# Patient Record
Sex: FEMALE | Race: ASIAN | Employment: UNEMPLOYED
[De-identification: names, ages, dates, MRNs, and addresses within clinical notes are randomized per-mention and may not be internally consistent; named-entity substitution may affect disease eponyms.]

---

## 2017-07-08 ENCOUNTER — HEALTH MAINTENANCE LETTER (OUTPATIENT)
Age: 47
End: 2017-07-08

## 2017-12-02 ENCOUNTER — TELEPHONE (OUTPATIENT)
Dept: FAMILY MEDICINE | Facility: CLINIC | Age: 47
End: 2017-12-02

## 2017-12-02 NOTE — TELEPHONE ENCOUNTER
12/2/2017    Call Regarding ReattributionPhysical    Attempt 1    Message on voicemail     Comments:       Outreach   SB

## 2018-02-16 NOTE — TELEPHONE ENCOUNTER
2/16/2018    Call Regarding Reattribution Physical       Attempt 2    Message on voicemail     Comments:       Outreach   Lesli Barnhart

## 2018-02-26 NOTE — TELEPHONE ENCOUNTER
2/26/2018    Call Regarding ReattributionPhysical    Attempt 3    Message on voicemail     Comments:       Outreach   SV

## 2020-03-17 ENCOUNTER — VIRTUAL VISIT (OUTPATIENT)
Dept: FAMILY MEDICINE | Facility: CLINIC | Age: 50
End: 2020-03-17
Payer: COMMERCIAL

## 2020-03-17 DIAGNOSIS — F51.04 PSYCHOPHYSIOLOGICAL INSOMNIA: ICD-10-CM

## 2020-03-17 DIAGNOSIS — F41.9 ANXIETY: Primary | ICD-10-CM

## 2020-03-17 PROCEDURE — 99442 ZZC PHYSICIAN TELEPHONE EVALUATION 11-20 MIN: CPT | Performed by: FAMILY MEDICINE

## 2020-03-17 RX ORDER — TRAZODONE HYDROCHLORIDE 100 MG/1
100 TABLET ORAL AT BEDTIME
Qty: 90 TABLET | Refills: 1 | Status: SHIPPED | OUTPATIENT
Start: 2020-03-17

## 2020-03-17 RX ORDER — SERTRALINE HYDROCHLORIDE 100 MG/1
TABLET, FILM COATED ORAL
Qty: 90 TABLET | Refills: 1 | Status: SHIPPED | OUTPATIENT
Start: 2020-03-17

## 2020-03-17 NOTE — PROGRESS NOTES
"Wendy Louise is a 50 year old female who is being evaluated via a billable telephone visit.      The patient has been notified of following:     \"This telephone visit will be conducted via a call between you and your physician/provider. We have found that certain health care needs can be provided without the need for a physical exam.  This service lets us provide the care you need with a short phone conversation.  If a prescription is necessary we can send it directly to your pharmacy.  If lab work is needed we can place an order for that and you can then stop by our lab to have the test done at a later time.    If during the course of the call the physician/provider feels a telephone visit is not appropriate, you will not be charged for this service.\"       Wendy Louise complains of    Chief Complaint   Patient presents with     Refill Request     depression really bad and would like medication. needing something to focus also.        I have reviewed and updated the patient's Past Medical History, Social History, Family History and Medication List.    ALLERGIES  Patient has no known allergies.    ELLA Gutiérrez   (MA signature)    Additional provider notes: patient stated more anxious lately. Patient is going through menopause and having hot flashes as well. Patient currently not working. Is having issues with sleeping.    Assessment/Plan:    (F41.9) Anxiety  (primary encounter diagnosis)  Comment:   Plan: sertraline (ZOLOFT) 100 MG tablet        In the past, sertraline has helped. Restart this. Recheck in 1 month to see how she is doing. Discussed that this may help with hot flashes as well.    (F51.04) Psychophysiological insomnia  Comment:   Plan: traZODone (DESYREL) 100 MG tablet        Restart trazodone.      I have reviewed the note as documented above.  This accurately captures the substance of my conversation with the patient.      Phone call contact time  Call Started at 345PM  Call Ended at 405PM    Benjie Downing MD, " MD

## 2021-03-27 ENCOUNTER — HEALTH MAINTENANCE LETTER (OUTPATIENT)
Age: 51
End: 2021-03-27

## 2021-09-05 ENCOUNTER — HEALTH MAINTENANCE LETTER (OUTPATIENT)
Age: 51
End: 2021-09-05

## 2022-04-17 ENCOUNTER — HEALTH MAINTENANCE LETTER (OUTPATIENT)
Age: 52
End: 2022-04-17

## 2022-10-23 ENCOUNTER — HEALTH MAINTENANCE LETTER (OUTPATIENT)
Age: 52
End: 2022-10-23

## 2023-06-01 ENCOUNTER — HEALTH MAINTENANCE LETTER (OUTPATIENT)
Age: 53
End: 2023-06-01